# Patient Record
Sex: FEMALE | Race: WHITE | NOT HISPANIC OR LATINO | Employment: FULL TIME | URBAN - METROPOLITAN AREA
[De-identification: names, ages, dates, MRNs, and addresses within clinical notes are randomized per-mention and may not be internally consistent; named-entity substitution may affect disease eponyms.]

---

## 2021-05-28 ENCOUNTER — RECORDS - HEALTHEAST (OUTPATIENT)
Dept: ADMINISTRATIVE | Facility: CLINIC | Age: 56
End: 2021-05-28

## 2021-05-29 ENCOUNTER — RECORDS - HEALTHEAST (OUTPATIENT)
Dept: ADMINISTRATIVE | Facility: CLINIC | Age: 56
End: 2021-05-29

## 2021-05-30 ENCOUNTER — RECORDS - HEALTHEAST (OUTPATIENT)
Dept: ADMINISTRATIVE | Facility: CLINIC | Age: 56
End: 2021-05-30

## 2021-06-01 ENCOUNTER — RECORDS - HEALTHEAST (OUTPATIENT)
Dept: ADMINISTRATIVE | Facility: CLINIC | Age: 56
End: 2021-06-01

## 2021-07-13 ENCOUNTER — RECORDS - HEALTHEAST (OUTPATIENT)
Dept: ADMINISTRATIVE | Facility: CLINIC | Age: 56
End: 2021-07-13

## 2021-07-21 ENCOUNTER — RECORDS - HEALTHEAST (OUTPATIENT)
Dept: ADMINISTRATIVE | Facility: CLINIC | Age: 56
End: 2021-07-21

## 2022-11-23 ENCOUNTER — APPOINTMENT (OUTPATIENT)
Dept: RADIOLOGY | Facility: CLINIC | Age: 57
End: 2022-11-23
Payer: OTHER MISCELLANEOUS

## 2022-11-23 ENCOUNTER — APPOINTMENT (OUTPATIENT)
Dept: CT IMAGING | Facility: CLINIC | Age: 57
End: 2022-11-23
Payer: OTHER MISCELLANEOUS

## 2022-11-23 ENCOUNTER — HOSPITAL ENCOUNTER (EMERGENCY)
Facility: CLINIC | Age: 57
Discharge: HOME | End: 2022-11-23
Attending: FAMILY MEDICINE | Admitting: FAMILY MEDICINE
Payer: OTHER MISCELLANEOUS

## 2022-11-23 VITALS
DIASTOLIC BLOOD PRESSURE: 84 MMHG | WEIGHT: 181 LBS | RESPIRATION RATE: 18 BRPM | HEART RATE: 56 BPM | HEIGHT: 65 IN | OXYGEN SATURATION: 99 % | TEMPERATURE: 98.1 F | BODY MASS INDEX: 30.16 KG/M2 | SYSTOLIC BLOOD PRESSURE: 141 MMHG
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
## 2022-11-23 DIAGNOSIS — M25.521 PAIN OF BOTH ELBOWS: ICD-10-CM

## 2022-11-23 DIAGNOSIS — S09.90XA INJURY OF HEAD, INITIAL ENCOUNTER: ICD-10-CM

## 2022-11-23 DIAGNOSIS — M53.3 SACRAL PAIN: ICD-10-CM

## 2022-11-23 DIAGNOSIS — S50.00XA CONTUSION OF ELBOW, UNSPECIFIED LATERALITY, INITIAL ENCOUNTER: ICD-10-CM

## 2022-11-23 DIAGNOSIS — M25.522 PAIN OF BOTH ELBOWS: ICD-10-CM

## 2022-11-23 PROCEDURE — 70450 CT HEAD/BRAIN W/O DYE: CPT

## 2022-11-23 PROCEDURE — 99285 EMERGENCY DEPT VISIT HI MDM: CPT | Mod: 25

## 2022-11-23 PROCEDURE — 73080 X-RAY EXAM OF ELBOW: CPT | Mod: RT

## 2022-11-23 PROCEDURE — 72220 X-RAY EXAM SACRUM TAILBONE: CPT

## 2022-11-23 ASSESSMENT — ENCOUNTER SYMPTOMS
NUMBNESS: 0
VOMITING: 0
NECK PAIN: 1
BACK PAIN: 1
ROS GI COMMENTS: NEGATIVE FOR INCONTINENCE.
PHOTOPHOBIA: 0
WEAKNESS: 0
NAUSEA: 1
PALPITATIONS: 0
BRUISES/BLEEDS EASILY: 0
HEADACHES: 1
ARTHRALGIAS: 1

## 2022-11-23 ASSESSMENT — ACTIVITIES OF DAILY LIVING (ADL): ADLS_ACUITY_SCORE: 35

## 2022-11-23 NOTE — ED PROVIDER NOTES
EMERGENCY DEPARTMENT ENCOUNTER      NAME: Keyana Dickerson  AGE: 57 year old female  YOB: 1965  MRN: [id number]  EVALUATION DATE & TIME: 11/23/2022  7:25 AM    PCP: No primary care provider on file.    ED PROVIDER: Dwayne Villalta M.D.    Chief Complaint   Patient presents with     Fall     Headache     Elbow Injury       FINAL IMPRESSION:  1. Fall, initial encounter    2. Pain of both elbows    3. Sacral pain    4. Contusion of elbow, unspecified laterality, initial encounter    5. Injury of head, initial encounter        ED COURSE & MEDICAL DECISION MAKING:    Pertinent Labs & Imaging studies personally reviewed and interpreted by me. (See chart for details)  7:34 AM Patient seen and examined by resident, Dr Dc. Prior records reviewed.  8:20 AM I met and examined the patient, obtained additional history.  Differential diagnosis includes but not limited to intracranial hemorrhage, skull fracture, contusion, scalp laceration, concussion, facial fracture, nasal fracture, mandible fracture, dental fracture.  Patient presents with slip and fall, hit the back of her head.  Persistent headache, no vomiting, no numbness or tingling in the arms or legs.  CT scan ordered per [facility] head injury criteria.  No midline cervical tenderness, full spontaneous range of motion of the neck, no indication for cervical imaging.  X-rays of the elbows ordered although patient has full range of motion and mild tenderness.  She does have some tenderness of the sacrum, x-rays ordered.  8:36 AM CT scan of the head personally reviewed and interpreted by me, no acute intracranial findings.  Radiology interpretation agrees.  X-rays of the elbows and sacrum are pending.  10:18 AM We discussed the plan for discharge and the patient is agreeable. Reviewed supportive cares, symptomatic treatment, outpatient follow up, and reasons to return to the Emergency Department. All questions and concerns were addressed. Patient to  be discharged by ED RN.      At the conclusion of the encounter I discussed the results of all of the tests and the disposition. The questions were answered. The patient or family acknowledged understanding and was agreeable with the care plan.     Medical Decision Making    Supplemental history from: N/A    External Record(s) Reviewed: Documented in HPI, if applicable.    Differential Diagnosis: See MDM charting for differential considered.     I performed an independent interpretation of the: Other: CT head, elbow xrays, sacrum/coccyx xrays    Discussed with radiology regarding test interpretation: CT Results and X-ray Results    Discussion of management with another provider: See chart documentation, if applicable    The following testing was considered but ultimately not selected: CT Imaging: pelvis, cervical spine and Labs: CBC, UA, Trop    I considered prescription management with: Symptomatic Management    The patient's care impacted: None    Consideration of Admission/Observation: N/A - Patient discharged without consideration for admission    Care significantly affected by Social Determinants of Health including: N/A    PROCEDURES:   Procedures    MEDICATIONS GIVEN IN THE EMERGENCY:  Medications - No data to display    NEW PRESCRIPTIONS STARTED AT TODAY'S ER VISIT  New Prescriptions    No medications on file       =================================================================    [facility]    Patient information was obtained from: patient       Keyana Dickerson is a 57 year old female with a pertinent history of anxiety and depression who presents to this ED by private car for evaluation of fall.    The patient reports she fell in a walk-in freezer at ~6:15 this morning. She states she slipped and then fell backward. She says she hit her head, elbows, and back on the floor. She denies any loss of consciousness and states her pony tail kept her head from hitting the ground as hard. The patient now endorses a  headache which starts in the back of her head and radiates up into her forehead. She took Excedrin for this which seems to be helping. She also endorses neck pain, bilateral elbow pain (R>L), low back stiffness, and tailbone tenderness. She denies any sharp pain in her low back or buttocks. She has been icing her right elbow since the fall. She notes she was nauseous when a coworker initially moved her in a cart but denies any nausea since. She denies any vomiting, vision changes, tinnitus, numbness, weakness, loss of bowel/bladder control, heel pain, or any other complaints at this time. She denies being on blood thinners.      REVIEW OF SYSTEMS   Review of Systems   Eyes: Negative for photophobia and visual disturbance.   Cardiovascular: Negative for chest pain and palpitations.   Gastrointestinal: Positive for nausea (resolved). Negative for vomiting.        Negative for incontinence.   Genitourinary:        Negative for incontinence.   Musculoskeletal: Positive for arthralgias (bilateral elbow pain, right worse than left), back pain (low back, stiff) and neck pain.        Positive for tailbone tenderness.   Neurological: Positive for headaches. Negative for syncope, weakness and numbness.   Hematological: Does not bruise/bleed easily.   All other systems reviewed and are negative.      PAST MEDICAL HISTORY:  History reviewed. No pertinent past medical history.    PAST SURGICAL HISTORY:  Past Surgical History:   Procedure Laterality Date     LAPAROSCOPIC CHOLECYSTECTOMY N/A 6/16/2015    Procedure: CHOLECYSTECTOMY LAPAROSCOPIC;  Surgeon: Heron Richmond MD;  Location: [facility];  Service:      OTHER SURGICAL HISTORY      removal of warts on foot     OTHER SURGICAL HISTORY      removal of skin canceron nose       CURRENT MEDICATIONS:    No current facility-administered medications for this encounter.     Current Outpatient Medications   Medication     buPROPion (WELLBUTRIN) 75 MG tablet      "cholecalciferol, vitamin D3, 1,000 unit tablet     FLUoxetine (PROZAC) 10 MG capsule     ketotifen (ZADITOR/ZYRTEC ITCHY EYES) 0.025 % ophthalmic solution     loratadine (CLARITIN) 10 mg tablet     MEDICATION CANNOT BE REORDERED - PLEASE MANUALLY REORDER AND DISCONTINUE THE OLD ORDER     multivitamin therapeutic (THERAGRAN) tablet     oxyCODONE-acetaminophen (PERCOCET) 5-325 mg per tablet     peg 400-propylene glycol (SYSTANE) 0.4-0.3 % Drop       ALLERGIES:  Allergies   Allergen Reactions     Codeine Sulfate [Codeine] Nausea and Vomiting       FAMILY HISTORY:  Family History   Problem Relation Age of Onset     Breast Cancer Maternal Grandmother         70s     Testicular cancer Paternal Grandfather         70s       SOCIAL HISTORY:   Social History     Socioeconomic History     Marital status:    Tobacco Use     Smoking status: Former     Tobacco comments:     quit 18 years ago   Substance and Sexual Activity     Alcohol use: Yes     Comment: Alcoholic Drinks/day: occasional     Drug use: No       VITALS:  BP (!) 141/84   Pulse 56   Temp 98.1  F (36.7  C) (Oral)   Resp 18   Ht 1.651 m (5' 5\")   Wt 82.1 kg (181 lb)   SpO2 99%   BMI 30.12 kg/m      PHYSICAL EXAM:  Physical Exam  Vitals and nursing note reviewed.   Constitutional:       Appearance: Normal appearance.   HENT:      Head: Normocephalic and atraumatic.      Right Ear: External ear normal.      Left Ear: External ear normal.      Nose: Nose normal.      Mouth/Throat:      Mouth: Mucous membranes are moist.   Eyes:      Extraocular Movements: Extraocular movements intact.      Conjunctiva/sclera: Conjunctivae normal.      Pupils: Pupils are equal, round, and reactive to light.   Cardiovascular:      Rate and Rhythm: Normal rate and regular rhythm.   Pulmonary:      Effort: Pulmonary effort is normal.      Breath sounds: Normal breath sounds. No wheezing or rales.   Abdominal:      General: Abdomen is flat. There is no distension.      " Palpations: Abdomen is soft.      Tenderness: There is no abdominal tenderness. There is no guarding.   Musculoskeletal:         General: Normal range of motion.      Right upper arm: No tenderness or bony tenderness.      Left upper arm: No tenderness or bony tenderness.      Right elbow: No swelling, deformity or lacerations. Normal range of motion. No tenderness.      Left elbow: No swelling, deformity or lacerations. Normal range of motion. No tenderness.      Right forearm: No tenderness.      Left forearm: No tenderness.      Cervical back: Normal range of motion and neck supple.      Right lower leg: No edema.      Left lower leg: No edema.      Comments: Bilateral elbow tenderness with full range of motion.  Sacral tenderness   Lymphadenopathy:      Cervical: No cervical adenopathy.   Skin:     General: Skin is warm and dry.      Findings: No abrasion, bruising, erythema, lesion or wound.   Neurological:      General: No focal deficit present.      Mental Status: She is alert and oriented to person, place, and time. Mental status is at baseline.      Cranial Nerves: Cranial nerves 2-12 are intact.      Sensory: Sensation is intact.      Motor: Motor function is intact.      Coordination: Coordination is intact.      Comments: No gross focal neurologic deficits   Psychiatric:         Mood and Affect: Mood normal.         Behavior: Behavior normal.         Thought Content: Thought content normal.          LAB:  All pertinent labs reviewed and interpreted.  Results for orders placed or performed during the hospital encounter of 11/23/22   XR Sacrum and Coccyx 2 Views    Impression    IMPRESSION: No definite evidence of an acute fracture. There is some periosteal new bone formation along the anterior margin of the distal sacrum which may reflect a healing nondisplaced fracture. Pessary device in place.   CT Head w/o Contrast    Impression    IMPRESSION:  1.  No evidence of acute intracranial hemorrhage.  2.  No  evidence of acute calvarial fracture.   XR Elbow Right G/E 3 Views    Impression    IMPRESSION: Normal joint spaces and alignment. No fracture or joint effusion. Mild soft tissue swelling posteriorly over the olecranon.   XR Elbow Left G/E 3 Views    Impression    IMPRESSION: Normal joint spaces and alignment. No fracture or joint effusion. Mild soft tissue swelling posteriorly over the olecranon.       RADIOLOGY:  Reviewed all pertinent imaging. Please see official radiology report.  XR Elbow Left G/E 3 Views   Final Result   IMPRESSION: Normal joint spaces and alignment. No fracture or joint effusion. Mild soft tissue swelling posteriorly over the olecranon.      XR Elbow Right G/E 3 Views   Final Result   IMPRESSION: Normal joint spaces and alignment. No fracture or joint effusion. Mild soft tissue swelling posteriorly over the olecranon.      XR Sacrum and Coccyx 2 Views   Final Result   IMPRESSION: No definite evidence of an acute fracture. There is some periosteal new bone formation along the anterior margin of the distal sacrum which may reflect a healing nondisplaced fracture. Pessary device in place.      CT Head w/o Contrast   Final Result   IMPRESSION:   1.  No evidence of acute intracranial hemorrhage.   2.  No evidence of acute calvarial fracture.        I, Elisabet Crump, am serving as a scribe to document services personally performed by Dr. Villalta based on my observation and the provider's statements to me. I, Dwayne Villalta MD attest that Elisabet Crump is acting in a scribe capacity, has observed my performance of the services and has documented them in accordance with my direction.    Dwayne Villalta M.D.  Emergency Medicine  [facility] EMERGENCY ROOM  [address] [id number] [phone]  Dept: [phone]

## 2022-11-23 NOTE — ED PROVIDER NOTES
EMERGENCY DEPARTMENT ENCOUNTER      NAME: eKyana Dickerson  AGE: 57 year old female  YOB: 1965  MRN: [id number]  EVALUATION DATE & TIME: 11/23/2022  7:25 AM    PCP: No primary care provider on file.    ED PROVIDER: Davina Dc MD    I was present with the resident, and personally performed a history of present illness, review of systems, and exam.  I discussed the case with the resident and agree with the findings and plan as documented in the resident's note.  Dwayne Villalta MD      Chief Complaint   Patient presents with     Fall     Headache     Elbow Injury         FINAL IMPRESSION:  1. Fall, initial encounter    2. Pain of both elbows    3. Sacral pain    4. Contusion of elbow, unspecified laterality, initial encounter    5. Injury of head, initial encounter        ED COURSE & MEDICAL DECISION MAKING:    Pertinent Labs & Imaging studies reviewed. (See chart for details)  57 year old female presents to the Emergency Department for evaluation of mechanical fall and hitting her head without LOC. She has headache, elbow pain and sacral tenderness. Exam was unremarkable. Ordered CT head according to [facility] Head Trauma/Injury Rule that was negative. Xray of elbow and sacrum was negative for acute fracture. Patient discharged home to follow up with concussion clinic.       ED Course as of 11/23/22 0937   Wed Nov 23, 2022   0818 Presented to the ED after mechanical fall at work hitting the back of her head. She has headaches but is neurologically intact.    0835 CT head negative for intracranial process or calvarial fracture.    0836 Xray of bilateral elbows and sacrum negative for acute fracture.         At the conclusion of the encounter I discussed the results of all of the tests and the disposition. The questions were answered. The patient or family acknowledged understanding and was agreeable with the care plan.       MEDICATIONS GIVEN IN THE EMERGENCY:  Medications - No data to  display    NEW PRESCRIPTIONS STARTED AT TODAY'S ER VISIT  New Prescriptions    No medications on file          =================================================================    HPI    Patient information was obtained from: Patient     Keyana Dickerson is a 57 year old female with a pertinent history of anxiety and MDD who presentes to the ED due to mechanical fall. Patient states that at 6:15.  She was walking into a walk-in cooler at work in [employer] when she slid and fell backwards due to slippery floor. Patient states that she fell backwards and hit the back of her head. She denied LOC. She developed headache at the back of her head and the front. She denied visual changes, ringing in her ears. She felt pain in both elbows and her sacrum. Patient denied numbness, tingling, weakness, loss of bladder or bowel control. She had Excedrin for the headaches. She reported nausea when she was transferred on a cart, but denies emesis. She was able to ambulate from the car to the ED without assistance. In the ED patient reports that she still has an headache, bilateral elbow pain and tenderness on her sacrum.     REVIEW OF SYSTEMS   As in HPI above, otherwise reminder of ROS negative.     PAST MEDICAL HISTORY:  History reviewed. No pertinent past medical history.    PAST SURGICAL HISTORY:  Past Surgical History:   Procedure Laterality Date     LAPAROSCOPIC CHOLECYSTECTOMY N/A 6/16/2015    Procedure: CHOLECYSTECTOMY LAPAROSCOPIC;  Surgeon: Heron Richmond MD;  Location: [facility] OR;  Service:      OTHER SURGICAL HISTORY      removal of warts on foot     OTHER SURGICAL HISTORY      removal of skin canceron nose           CURRENT MEDICATIONS:    buPROPion (WELLBUTRIN) 75 MG tablet  cholecalciferol, vitamin D3, 1,000 unit tablet  FLUoxetine (PROZAC) 10 MG capsule  ketotifen (ZADITOR/ZYRTEC ITCHY EYES) 0.025 % ophthalmic solution  loratadine (CLARITIN) 10 mg tablet  MEDICATION CANNOT BE REORDERED - PLEASE MANUALLY  "REORDER AND DISCONTINUE THE OLD ORDER  multivitamin therapeutic (THERAGRAN) tablet  oxyCODONE-acetaminophen (PERCOCET) 5-325 mg per tablet  peg 400-propylene glycol (SYSTANE) 0.4-0.3 % Drop        ALLERGIES:  Allergies   Allergen Reactions     Codeine Sulfate [Codeine] Nausea and Vomiting       FAMILY HISTORY:  Family History   Problem Relation Age of Onset     Breast Cancer Maternal Grandmother         70s     Testicular cancer Paternal Grandfather         70s       SOCIAL HISTORY:   Social History     Socioeconomic History     Marital status:      Spouse name: None     Number of children: None     Years of education: None     Highest education level: None   Tobacco Use     Smoking status: Former     Tobacco comments:     quit 18 years ago   Substance and Sexual Activity     Alcohol use: Yes     Comment: Alcoholic Drinks/day: occasional     Drug use: No       VITALS:  BP (!) 141/84   Pulse 56   Temp 98.1  F (36.7  C) (Oral)   Resp 18   Ht 1.651 m (5' 5\")   Wt 82.1 kg (181 lb)   SpO2 99%   BMI 30.12 kg/m      PHYSICAL EXAM    Constitutional: Well developed, Well nourished, NAD  HENT: Normocephalic, Atraumatic  Neck- Normal range of motion, No tenderness, mild discomfort with extension   Eyes: EOMI, Conjunctiva normal, No discharge.   Respiratory: Normal breath sounds, No respiratory distress, No wheezing  Cardiovascular: Normal heart rate, Regular rhythm, No murmurs  GI: No excessive obesity. Bowel sounds normal, Soft, No tenderness  Musculoskeletal: 2+ DP pulses. No edema. No bruising or scrapping.  Good range of motion in all major joints. No tenderness to palpation or major deformities noted. No tenderness of the CTL spine, however tenderness on S spine.  Neurologic: Alert & oriented x 3, Normal motor function, Normal sensory function, No focal deficits noted. Normal gait.  Psychiatric: Affect normal, Judgment normal, Mood normal.     LAB:  All pertinent labs reviewed and interpreted.  Results for " orders placed or performed during the hospital encounter of 11/23/22   XR Sacrum and Coccyx 2 Views    Impression    IMPRESSION: No definite evidence of an acute fracture. There is some periosteal new bone formation along the anterior margin of the distal sacrum which may reflect a healing nondisplaced fracture. Pessary device in place.   CT Head w/o Contrast    Impression    IMPRESSION:  1.  No evidence of acute intracranial hemorrhage.  2.  No evidence of acute calvarial fracture.   XR Elbow Right G/E 3 Views    Impression    IMPRESSION: Normal joint spaces and alignment. No fracture or joint effusion. Mild soft tissue swelling posteriorly over the olecranon.   XR Elbow Left G/E 3 Views    Impression    IMPRESSION: Normal joint spaces and alignment. No fracture or joint effusion. Mild soft tissue swelling posteriorly over the olecranon.       RADIOLOGY:  Reviewed all pertinent imaging. Please see official radiology report.  XR Elbow Left G/E 3 Views   Final Result   IMPRESSION: Normal joint spaces and alignment. No fracture or joint effusion. Mild soft tissue swelling posteriorly over the olecranon.      XR Elbow Right G/E 3 Views   Final Result   IMPRESSION: Normal joint spaces and alignment. No fracture or joint effusion. Mild soft tissue swelling posteriorly over the olecranon.      XR Sacrum and Coccyx 2 Views   Final Result   IMPRESSION: No definite evidence of an acute fracture. There is some periosteal new bone formation along the anterior margin of the distal sacrum which may reflect a healing nondisplaced fracture. Pessary device in place.      CT Head w/o Contrast   Final Result   IMPRESSION:   1.  No evidence of acute intracranial hemorrhage.   2.  No evidence of acute calvarial fracture.          I precepted with Dr. Villalta.     Davina Dc MD  [facility] EMERGENCY ROOM  [address] [id number] [phone]     Davina Dc,  MD  Resident  11/23/22 0935       Dwayne Villalta MD  11/23/22 0437

## 2022-11-23 NOTE — LETTER
RETURN TO WORK/SCHOOL FORM    11/23/2022    Re: Keyana Dickerson  1965      To Whom It May Concern:     Keyana Dickerson at the Emergency Room today.  She may return to work without restrictions on 11/28/22. If you have any questions or concerns, please feel free to call us.           Restrictions:  Nonefull duty          Davina Dc MD   11/23/2022 9:39 AM

## 2022-11-23 NOTE — DISCHARGE INSTRUCTIONS
Take Tylenol and ibuprofen as needed for headache.    The concussion clinic will call you for follow-up care.

## 2022-11-23 NOTE — ED TRIAGE NOTES
Patient presents after a fall at work, slipping on wet floor, hit the back of her head and back when she fell and now has pain to front of head, pain to both elbows, more pain to R elbow, has some neck pain with no C spine tenderness, denies LOC, denies taking thinners.  Rachna Em RN.......11/23/2022 7:24 AM     Triage Assessment     Row Name 11/23/22 0520       Triage Assessment (Adult)    Airway WDL WDL       Respiratory WDL    Respiratory WDL WDL       Skin Circulation/Temperature WDL    Skin Circulation/Temperature WDL WDL       Cardiac WDL    Cardiac WDL WDL       Peripheral/Neurovascular WDL    Peripheral Neurovascular WDL WDL       Cognitive/Neuro/Behavioral WDL    Cognitive/Neuro/Behavioral WDL WDL

## 2023-01-06 ENCOUNTER — TELEPHONE (OUTPATIENT)
Dept: NEUROLOGY | Facility: CLINIC | Age: 58
End: 2023-01-06

## 2023-01-06 ENCOUNTER — VIRTUAL VISIT (OUTPATIENT)
Dept: NEUROLOGY | Facility: CLINIC | Age: 58
End: 2023-01-06
Payer: OTHER MISCELLANEOUS

## 2023-01-06 DIAGNOSIS — S09.90XA INJURY OF HEAD, INITIAL ENCOUNTER: ICD-10-CM

## 2023-01-06 DIAGNOSIS — F07.81 POST CONCUSSION SYNDROME: Primary | ICD-10-CM

## 2023-01-06 PROCEDURE — 99204 OFFICE O/P NEW MOD 45 MIN: CPT | Mod: 95 | Performed by: NURSE PRACTITIONER

## 2023-01-06 NOTE — NURSING NOTE
Concussion Symptoms Rating    Headache or Pressure In Head 0 - none   Upset Stomach or Throwing Up 0 - none   Problems with Balance 0 - none   Feeling Dizzy 0 - none   Sensitivity to Light 0 - none   Sensitivity to Noise 0 - none   Mood Changes 0 - none   Feeling sluggish, hazy, or foggy 1 - mild   Trouble Concentrating, Lack of Focus 2 - mild to moderate   Motion Sickness 0 - none   Vision Changes 0 - none   Memory Problems 1 - mild   Feeling Confused 0 - none   Neck Pain 0 - none   Trouble Sleeping 0 - none   Total Number of Symptoms 3   Symptom Severity Score 4
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
## 2023-01-09 ENCOUNTER — TELEPHONE (OUTPATIENT)
Dept: NEUROLOGY | Facility: CLINIC | Age: 58
End: 2023-01-09

## 2023-01-09 NOTE — TELEPHONE ENCOUNTER
"Per Marly's message:    \"Please email patient work letter, QRC list, and consult overview and help her get on my chart.\"    I emailed the pt her work letter, QRC list, and consult overview to the pt at [email or web address].    I called the pt to inform her that the work letter, qrc list and the overview sheet was emailed to her. I was going to help her get set up for [employer] but per pt is at another appt and asked that I call her back at 3pm.     I will call pt back at 3pm.    RINA Franco on 1/9/2023 at 9:57 AM        "

## 2023-01-09 NOTE — TELEPHONE ENCOUNTER
Called the pt back and per pt was able to get set up for MyChart and have no other questions. Per pt has not heard from someone about scheduling for OT and Speech therapy. I gave the pt the number to call, [phone] . Pt had no other questions.    RINA Franco on 1/9/2023 at 3:52 PM

## 2023-02-11 ENCOUNTER — HEALTH MAINTENANCE LETTER (OUTPATIENT)
Age: 58
End: 2023-02-11

## 2023-03-17 ENCOUNTER — VIRTUAL VISIT (OUTPATIENT)
Dept: NEUROLOGY | Facility: CLINIC | Age: 58
End: 2023-03-17
Payer: OTHER MISCELLANEOUS

## 2023-03-17 DIAGNOSIS — F07.81 POST CONCUSSION SYNDROME: Primary | ICD-10-CM

## 2023-03-17 PROCEDURE — 99213 OFFICE O/P EST LOW 20 MIN: CPT | Mod: 95 | Performed by: NURSE PRACTITIONER

## 2023-03-17 NOTE — PROGRESS NOTES
"  Telephone Visit: Concussion Follow up:   Keyana Dickerson is a 57 year old female who is being evaluated via a billable telephone visit       The patient has been notified of the following:     \"This telephone visit will be conducted via a phone call between you and your provider. We have found that certain health care needs can be provided without the need for a physical exam.  This service lets us provide the care you need with a short phone conversation.  If a prescription is necessary we can send it directly to your pharmacy.  If lab work is needed we can place an order for that and you can then stop by our lab to have the test done at a later time.    If during the course of the call the provider feels a telephone visit is not appropriate, you will not be charged for this service.\"     Patient has given verbal consent to a telephone visit? Yes    Visit Check In:   Orders from previous visit: OT and ST for cognition, Short Neuropsych     Neuropsychological assessment completed    No   Currently doing PT  No    Completed No   Currently doing OT  No    Completed No   Currently doing ST   No    Completed No   Psychology  No   Return to Work/School   Full scheduled hours  Yes      Currently on medication to help with sleep    No    Currently on any mental health medications     Yes    Prozac and Wellbutrin  Currently on medication for attention, ADD/ADHD    No                                                          Workman's Comp   Yes   QRC   No      Outpatient Follow up Mild TBI (Concussion)  Evaluation:   Keyana Dickerson chief complaint is Post Concussion Syndrome     Is patient on a controlled substance prescribed by me?  No     HPI:      Pertinent History:   Per ED note on 11/23/22...Keyana Dickerson is a 57 year old female with a pertinent history of anxiety and MDD who presents to the ED due to mechanical fall. Patient states that at 6:15.  She was walking into a walk-in cooler at work in [facility] when she slid " and fell backwards due to slippery floor. Patient states that she fell backwards and hit the back of her head. She denied LOC. She developed headache at the back of her head and the front. She denied visual changes, ringing in her ears. She felt pain in both elbows and her sacrum. Patient denied numbness, tingling, weakness, loss of bladder or bowel control. She had Excedrin for the headaches. She reported nausea when she was transferred on a cart, but denies emesis. She was able to ambulate from the car to the ED without assistance. In the ED patient reports that she still has an headache, bilateral elbow pain and tenderness on her sacrum.     Date of accident :  11/23/22    Plan:        We discussed some treatment options and have elected to   patient will get a QR C, she will still make sure she is approved for occupational and speech therapy/OT and ST will be reordered, we will put neuropsych on hold, and wait for that to be approved before scheduling, patient will continue with full work hours with accommodations    Medication Adjustment:  No medication changes    Return to Work/School   Full scheduled hours  Yes    Note completed    Yes      Return to clinic 10 weeks    Continue with the support of the clinic, reassurance, and redirection. Staff monitoring and ongoing assessments per team plan. This team will utilize appropriate emergency services if necessary. I will make myself available if concerns or problems arise.  The patient agrees to call/message before her next visit with any questions, concerns or problems.    Progress Note:        The patient returns to the concussion clinic for a follow up visit, She was last seen by me on 1/6/2023, no medication changes were made at that appointment.  The patient was unable to get hold of her  to make sure that the therapies were approved so the patient has not yet started these.  She is having a very difficult time getting a hold of the  and she  was waiting until they are approved to start.  The patient's main concern continues to be her cognition.  She feels that she has a really bad memory and cannot concentrate.  Overall patient is reporting no change in mental fogginess, concentration, and memory.  Patient does report worsening in sensitivity to light and noise, dizziness, motion sickness, being able to fall and stay asleep and wake up feeling rested     Subjective:        Overall improvement from last visit   No     Physical Symptoms:  Headache-No        Nausea-No      Balance problems - Yes    Since last visit  Worsen      Dizziness - Yes          Since last visit  Worsen     Visual problems - No      Fatigue - Yes              Since last visit  Worsen     Sensitivity to light - Yes        Since last visit  Worsen  Sensitivity to sound - Yes       Since last visit  Worsen     Numbness/tingling - No             Cognitive Symptoms  Feeling mentally foggy -Yes        Since last visit  Same     Feeling confused -No          Difficulty Concentrating- Yes      Since last visit  Same     Difficulty remembering - Yes        Since last visit  Same       Emotional Symptoms  Irritability - No            Sadness-  No          More emotional - No           Nervousness/anxiety -No             Sleep History:  Sleep less than usual - No    Sleep more than usual - No    Trouble falling asleep - Yes       Since last visit  Worsen     Trouble staying asleep - Yes       Since last visit  Worsen     Wake feeling rested - No         Since last visit  Worsen        Exertion:         Do the above stated symptoms worsen with physical activity? Yes        Since last visit  Worsen           Do the above stated symptoms worsen with cognitive activity? Yes       Since last visit  Worsen            Objective:     Patient Active Problem List    Diagnosis Date Noted     Vitamin D Deficiency      Priority: Medium     Created by Conversion  Replacement Utility updated for latest O  load         Major Depression, Recurrent      Priority: Medium     Created by Conversion  Replacement Utility updated for latest IMO load         Anxiety      Priority: Medium     Created by Conversion  Replacement Utility updated for latest IMO load         Menopausal Disorder      Priority: Medium     Created by Conversion  Replacement Utility updated for latest IMO load         Symptomatic cholelithiasis 06/17/2015     Priority: Medium     Lap verónica 6/16/15         Serum Total Cholesterol Was Elevated      Priority: Medium     Created by Conversion         Costochondritis (Tietze's Syndrome)      Priority: Medium     Created by Conversion         [place] Of [address] Motor Vehicles On Road - Driving (Not Motorcycle      Priority: Medium     Created by Conversion         Depression      Priority: Medium     Created by Conversion         Xerosis Cutis      Priority: Medium     Created by Conversion         No past medical history on file.  Past Surgical History:   Procedure Laterality Date     LAPAROSCOPIC CHOLECYSTECTOMY N/A 6/16/2015    Procedure: CHOLECYSTECTOMY LAPAROSCOPIC;  Surgeon: Heron Richmond MD;  Location: [facility];  Service:      OTHER SURGICAL HISTORY      removal of warts on foot     OTHER SURGICAL HISTORY      removal of skin canceron nose     Family History   Problem Relation Age of Onset     Breast Cancer Maternal Grandmother         70s     Testicular cancer Paternal Grandfather         70s     Current Outpatient Medications   Medication Sig Dispense Refill     buPROPion (WELLBUTRIN) 75 MG tablet [BUPROPION (WELLBUTRIN) 75 MG TABLET] Take 1 tablet (75 mg total) by mouth daily. 90 tablet 1     cholecalciferol, vitamin D3, 1,000 unit tablet [CHOLECALCIFEROL, VITAMIN D3, 1,000 UNIT TABLET] Take 1,000 Units by mouth daily.       FLUoxetine (PROZAC) 10 MG capsule [FLUOXETINE (PROZAC) 10 MG CAPSULE] Take 3 capsules (30 mg total) by mouth daily. 90 capsule 0     loratadine (CLARITIN) 10 mg tablet  [LORATADINE (CLARITIN) 10 MG TABLET] Take 10 mg by mouth daily.       oxyCODONE-acetaminophen (PERCOCET) 5-325 mg per tablet [OXYCODONE-ACETAMINOPHEN (PERCOCET) 5-325 MG PER TABLET] Take 1-2 tablets by mouth every 6 (six) hours as needed for pain. 20 tablet 0     Social History     Socioeconomic History     Marital status:      Spouse name: Not on file     Number of children: Not on file     Years of education: Not on file     Highest education level: Not on file   Occupational History     Not on file   Tobacco Use     Smoking status: Former     Smokeless tobacco: Not on file     Tobacco comments:     quit 18 years ago   Substance and Sexual Activity     Alcohol use: Yes     Comment: Alcoholic Drinks/day: occasional     Drug use: No     Sexual activity: Not on file   Other Topics Concern     Not on file   Social History Narrative     Not on file     Social Determinants of Health     Financial Resource Strain: Not on file   Food Insecurity: Not on file   Transportation Needs: Not on file   Physical Activity: Not on file   Stress: Not on file   Social Connections: Not on file   Intimate Partner Violence: Not on file   Housing Stability: Not on file       ALLERGIES  Codeine sulfate [codeine]    The following portions of the patient's history were reviewed and updated as appropriate: allergies, current medications, past family history, past medical history, past social history, past surgical history and problem list.    Review of Systems  A comprehensive review of systems was negative except for: What is noted above    Mental Status Examination  Alertness:  alert  and oriented  Appearance:  casually groomed  Behavior/Demeanor:  cooperative, pleasant and calm, with good  eye contact.  Speech:  normal  Psychomotor:  normal or unremarkable    Mood:  good  Affect:  appropriate and was congruent to speech content.  Thought Process/Associations: unremarkable   Thought Content: devoid of  suicidal and violent ideation  and delusions.   Perception: devoid of  auditory hallucinations and visual hallucinations  Insight:  good.  Judgment: good.  Attention/Concentration:  Normal  Language:  Intact  Fund of Knowledge:  Average.    Memory:  Immediate recall intact, Short-term memory intact and Long-term memory intact.       Counseling:   Discussion was held with the patient today regarding concussion in general including types of injury, symptoms that are common, treatment and variability in time to recover  I have reassured the patient her symptoms are very common when a concussion is present and will improve with time. We discussed the risks and benefits of possible medication used to help concussive symptoms including risk of worsening depression with medication adjustments and even the possibility of emergence of suicidal ideations. We will assess for the appropriateness of possible psychotropic medication trials/changes. The patient will seek out appropriate emergency services should that become necessary. The patient agrees to call/message before her next visit with any questions, concerns or problems.    Visit Details:   Type of service: Telephone Visit    Visit Start Time: 1438    Visit End Time:  1448    Originating Location (pt. Location): Home    Distant Location:  Distant Location (provider location):  Off-site    Base Clinic:  [facility]  [place]    Mode of Communication: Telephone Conference via  [facility]     Diagnosis managed and treated at today's visit :  Post concussion syndrome  Post concussion headache  Nausea  Dizziness  Fatigue  Insomnia  Sensitivity to light  Sound sensitivity  Concentration and Attention deficit  Memory difficulties  Anxiety d/t a medical condition  Irritability  Return to work  Encounter related to a worker's comp claim    Total time today (15 min) in this patient encounter was spent on pre-charting, chart review, review of outside records, review of test results,  interpretation of tests, patient visit, documentation and return to work letter and counseling and/or coordination of care. The patient is in agreement with this plan and has no further questions.    General Information:   Today you had your appointment with Marly Verdugo CNP     If lab work was done today as part of your evaluation you will generally be contacted via My Chart, mail, or phone with the results within 1-5 days. If there is an alarming result we will contact you by phone. Lab results come back at varying times, I generally wait until all labs are resulted before making comments on results. Please note labs are automatically released to My Chart once available.     If you need refills please contact your pharmacist. They will send a refill request to me to review. If it is a controlled substance please message me through [employer]. Please allow 3 business days for us to process all refill requests.     Please call or send a medical message through My Chart, with any questions or concerns    If you need any paperwork completed please fax forms to [phone]. Please state if you would like a copy of the completed paperwork, mailed or faxed back to the patient and a fax number to fax the paperwork to. Please allow up to 10 business days for paperwork to be completed.    BABITA Lyman, CNP      [facility]  [facility] [address]  [address] [place] [address]  Office: [phone]  Fax: [phone]

## 2023-03-17 NOTE — LETTER
"    3/17/2023         RE: Keyana Dickerson  [address] [facility] [address]        Dear Colleague,    Thank you for referring your patient, Keyana Dickerson, to the [facility]. Please see a copy of my visit note below.      Telephone Visit: Concussion Follow up:   Keyana Dickerson is a 57 year old female who is being evaluated via a billable telephone visit       The patient has been notified of the following:     \"This telephone visit will be conducted via a phone call between you and your provider. We have found that certain health care needs can be provided without the need for a physical exam.  This service lets us provide the care you need with a short phone conversation.  If a prescription is necessary we can send it directly to your pharmacy.  If lab work is needed we can place an order for that and you can then stop by our lab to have the test done at a later time.    If during the course of the call the provider feels a telephone visit is not appropriate, you will not be charged for this service.\"     Patient has given verbal consent to a telephone visit? Yes    Visit Check In:   Orders from previous visit: OT and ST for cognition, Short Neuropsych     Neuropsychological assessment completed    No   Currently doing PT  No    Completed No   Currently doing OT  No    Completed No   Currently doing ST   No    Completed No   Psychology  No   Return to Work/School   Full scheduled hours  Yes      Currently on medication to help with sleep    No    Currently on any mental health medications     Yes    Prozac and Wellbutrin  Currently on medication for attention, ADD/ADHD    No                                                          Workman's Comp   Yes   QRC   No      Outpatient Follow up Mild TBI (Concussion)  Evaluation:   Keyana Dickerson chief complaint is Post Concussion Syndrome     Is patient on a controlled substance prescribed by me?  No     HPI:    "   Pertinent History:   Per ED note on 11/23/22...Keyana Dickerson is a 57 year old female with a pertinent history of anxiety and MDD who presents to the ED due to mechanical fall. Patient states that at 6:15.  She was walking into a walk-in cooler at work in [employer] when she slid and fell backwards due to slippery floor. Patient states that she fell backwards and hit the back of her head. She denied LOC. She developed headache at the back of her head and the front. She denied visual changes, ringing in her ears. She felt pain in both elbows and her sacrum. Patient denied numbness, tingling, weakness, loss of bladder or bowel control. She had Excedrin for the headaches. She reported nausea when she was transferred on a cart, but denies emesis. She was able to ambulate from the car to the ED without assistance. In the ED patient reports that she still has an headache, bilateral elbow pain and tenderness on her sacrum.     Date of accident :  11/23/22    Plan:        We discussed some treatment options and have elected to   patient will get a QR C, she will still make sure she is approved for occupational and speech therapy/OT and ST will be reordered, we will put neuropsych on hold, and wait for that to be approved before scheduling, patient will continue with full work hours with accommodations    Medication Adjustment:  No medication changes    Return to Work/School   Full scheduled hours  Yes    Note completed    Yes      Return to clinic 10 weeks    Continue with the support of the clinic, reassurance, and redirection. Staff monitoring and ongoing assessments per team plan. This team will utilize appropriate emergency services if necessary. I will make myself available if concerns or problems arise.  The patient agrees to call/message before her next visit with any questions, concerns or problems.    Progress Note:        The patient returns to the concussion clinic for a follow up visit, She was last seen by me on  1/6/2023, no medication changes were made at that appointment.  The patient was unable to get hold of her  to make sure that the therapies were approved so the patient has not yet started these.  She is having a very difficult time getting a hold of the  and she was waiting until they are approved to start.  The patient's main concern continues to be her cognition.  She feels that she has a really bad memory and cannot concentrate.  Overall patient is reporting no change in mental fogginess, concentration, and memory.  Patient does report worsening in sensitivity to light and noise, dizziness, motion sickness, being able to fall and stay asleep and wake up feeling rested     Subjective:        Overall improvement from last visit   No     Physical Symptoms:  Headache-No        Nausea-No      Balance problems - Yes    Since last visit  Worsen      Dizziness - Yes          Since last visit  Worsen     Visual problems - No      Fatigue - Yes              Since last visit  Worsen     Sensitivity to light - Yes        Since last visit  Worsen  Sensitivity to sound - Yes       Since last visit  Worsen     Numbness/tingling - No             Cognitive Symptoms  Feeling mentally foggy -Yes        Since last visit  Same     Feeling confused -No          Difficulty Concentrating- Yes      Since last visit  Same     Difficulty remembering - Yes        Since last visit  Same       Emotional Symptoms  Irritability - No            Sadness-  No          More emotional - No           Nervousness/anxiety -No             Sleep History:  Sleep less than usual - No    Sleep more than usual - No    Trouble falling asleep - Yes       Since last visit  Worsen     Trouble staying asleep - Yes       Since last visit  Worsen     Wake feeling rested - No         Since last visit  Worsen        Exertion:         Do the above stated symptoms worsen with physical activity? Yes        Since last visit  Worsen           Do the above  stated symptoms worsen with cognitive activity? Yes       Since last visit  Worsen            Objective:     Patient Active Problem List    Diagnosis Date Noted     Vitamin D Deficiency      Priority: Medium     Created by Conversion  Replacement Utility updated for latest IMO load         Major Depression, Recurrent      Priority: Medium     Created by Conversion  Replacement Utility updated for latest IMO load         Anxiety      Priority: Medium     Created by Conversion  Replacement Utility updated for latest IMO load         Menopausal Disorder      Priority: Medium     Created by Conversion  Replacement Utility updated for latest IMO load         Symptomatic cholelithiasis 06/17/2015     Priority: Medium     Lap verónica 6/16/15         Serum Total Cholesterol Was Elevated      Priority: Medium     Created by Conversion         Costochondritis (Tietze's Syndrome)      Priority: Medium     Created by Conversion         [place] Of [address] Motor Vehicles On Road - Driving (Not Motorcycle      Priority: Medium     Created by Conversion         Depression      Priority: Medium     Created by Conversion         Xerosis Cutis      Priority: Medium     Created by Conversion         No past medical history on file.  Past Surgical History:   Procedure Laterality Date     LAPAROSCOPIC CHOLECYSTECTOMY N/A 6/16/2015    Procedure: CHOLECYSTECTOMY LAPAROSCOPIC;  Surgeon: Heron Richmond MD;  Location: [facility];  Service:      OTHER SURGICAL HISTORY      removal of warts on foot     OTHER SURGICAL HISTORY      removal of skin canceron nose     Family History   Problem Relation Age of Onset     Breast Cancer Maternal Grandmother         70s     Testicular cancer Paternal Grandfather         70s     Current Outpatient Medications   Medication Sig Dispense Refill     buPROPion (WELLBUTRIN) 75 MG tablet [BUPROPION (WELLBUTRIN) 75 MG TABLET] Take 1 tablet (75 mg total) by mouth daily. 90 tablet 1     cholecalciferol, vitamin  D3, 1,000 unit tablet [CHOLECALCIFEROL, VITAMIN D3, 1,000 UNIT TABLET] Take 1,000 Units by mouth daily.       FLUoxetine (PROZAC) 10 MG capsule [FLUOXETINE (PROZAC) 10 MG CAPSULE] Take 3 capsules (30 mg total) by mouth daily. 90 capsule 0     loratadine (CLARITIN) 10 mg tablet [LORATADINE (CLARITIN) 10 MG TABLET] Take 10 mg by mouth daily.       oxyCODONE-acetaminophen (PERCOCET) 5-325 mg per tablet [OXYCODONE-ACETAMINOPHEN (PERCOCET) 5-325 MG PER TABLET] Take 1-2 tablets by mouth every 6 (six) hours as needed for pain. 20 tablet 0     Social History     Socioeconomic History     Marital status:      Spouse name: Not on file     Number of children: Not on file     Years of education: Not on file     Highest education level: Not on file   Occupational History     Not on file   Tobacco Use     Smoking status: Former     Smokeless tobacco: Not on file     Tobacco comments:     quit 18 years ago   Substance and Sexual Activity     Alcohol use: Yes     Comment: Alcoholic Drinks/day: occasional     Drug use: No     Sexual activity: Not on file   Other Topics Concern     Not on file   Social History Narrative     Not on file     Social Determinants of Health     Financial Resource Strain: Not on file   Food Insecurity: Not on file   Transportation Needs: Not on file   Physical Activity: Not on file   Stress: Not on file   Social Connections: Not on file   Intimate Partner Violence: Not on file   Housing Stability: Not on file       ALLERGIES  Codeine sulfate [codeine]    The following portions of the patient's history were reviewed and updated as appropriate: allergies, current medications, past family history, past medical history, past social history, past surgical history and problem list.    Review of Systems  A comprehensive review of systems was negative except for: What is noted above    Mental Status Examination  Alertness:  alert  and oriented  Appearance:  casually groomed  Behavior/Demeanor:   cooperative, pleasant and calm, with good  eye contact.  Speech:  normal  Psychomotor:  normal or unremarkable    Mood:  good  Affect:  appropriate and was congruent to speech content.  Thought Process/Associations: unremarkable   Thought Content: devoid of  suicidal and violent ideation and delusions.   Perception: devoid of  auditory hallucinations and visual hallucinations  Insight:  good.  Judgment: good.  Attention/Concentration:  Normal  Language:  Intact  Fund of Knowledge:  Average.    Memory:  Immediate recall intact, Short-term memory intact and Long-term memory intact.       Counseling:   Discussion was held with the patient today regarding concussion in general including types of injury, symptoms that are common, treatment and variability in time to recover  I have reassured the patient her symptoms are very common when a concussion is present and will improve with time. We discussed the risks and benefits of possible medication used to help concussive symptoms including risk of worsening depression with medication adjustments and even the possibility of emergence of suicidal ideations. We will assess for the appropriateness of possible psychotropic medication trials/changes. The patient will seek out appropriate emergency services should that become necessary. The patient agrees to call/message before her next visit with any questions, concerns or problems.    Visit Details:   Type of service: Telephone Visit    Visit Start Time: 1438    Visit End Time:  1448    Originating Location (pt. Location): Home    Distant Location:  Distant Location (provider location):  Off-site    Base Clinic:  [facility]  [place]    Mode of Communication: Telephone Conference via  [facility]     Diagnosis managed and treated at today's visit :  Post concussion syndrome  Post concussion headache  Nausea  Dizziness  Fatigue  Insomnia  Sensitivity to light  Sound sensitivity  Concentration and  Attention deficit  Memory difficulties  Anxiety d/t a medical condition  Irritability  Return to work  Encounter related to a worker's comp claim    Total time today (15 min) in this patient encounter was spent on pre-charting, chart review, review of outside records, review of test results, interpretation of tests, patient visit, documentation and return to work letter and counseling and/or coordination of care. The patient is in agreement with this plan and has no further questions.    General Information:   Today you had your appointment with Marly Verdugo CNP     If lab work was done today as part of your evaluation you will generally be contacted via My Chart, mail, or phone with the results within 1-5 days. If there is an alarming result we will contact you by phone. Lab results come back at varying times, I generally wait until all labs are resulted before making comments on results. Please note labs are automatically released to My Chart once available.     If you need refills please contact your pharmacist. They will send a refill request to me to review. If it is a controlled substance please message me through demandmart. Please allow 3 business days for us to process all refill requests.     Please call or send a medical message through My Chart, with any questions or concerns    If you need any paperwork completed please fax forms to [phone]. Please state if you would like a copy of the completed paperwork, mailed or faxed back to the patient and a fax number to fax the paperwork to. Please allow up to 10 business days for paperwork to be completed.    BABITA Lyman CNP      [facility]  [facility] [address]  [address] [place] [address]  Office: [phone]  Fax: [phone]          Again, thank you for allowing me to participate in the care of your patient.         Sincerely,        BABITA Lyman CNP

## 2023-03-17 NOTE — NURSING NOTE
Is the patient currently in the state of [place]? YES    Visit mode:VIDEO    If the visit is dropped, the patient can be reconnected by: VIDEO VISIT: Text to cell phone: [phone]    Will anyone else be joining the visit? NO      How would you like to obtain your AVS? MyChart    Are changes needed to the allergy or medication list? NO    Reason for visit: Follow up     CONCUSSION SYMPTOMS ASSESSMENT 1/6/2023 3/17/2023   Headache or Pressure In Head 0 - none 0 - none   Upset Stomach or Throwing Up 0 - none 0 - none   Problems with Balance 0 - none 1 - mild   Feeling Dizzy 0 - none 1 - mild   Sensitivity to Light 0 - none 2 - mild to moderate   Sensitivity to Noise 0 - none 2 - mild to moderate   Mood Changes 0 - none 0 - none   Feeling sluggish, hazy, or foggy 1 - mild 1 - mild   Trouble Concentrating, Lack of Focus 2 - mild to moderate 2 - mild to moderate   Motion Sickness 0 - none 1 - mild   Vision Changes 0 - none 0 - none   Memory Problems 1 - mild 1 - mild   Feeling Confused 0 - none 0 - none   Neck Pain 0 - none 0 - none   Trouble Sleeping 0 - none 1 - mild   Total Number of Symptoms 3 9   Symptom Severity Score 4 12

## 2023-03-20 ENCOUNTER — TELEPHONE (OUTPATIENT)
Dept: NEUROLOGY | Facility: CLINIC | Age: 58
End: 2023-03-20

## 2023-03-28 ENCOUNTER — TELEPHONE (OUTPATIENT)
Dept: NEUROLOGY | Facility: CLINIC | Age: 58
End: 2023-03-28

## 2023-03-29 ENCOUNTER — TELEPHONE (OUTPATIENT)
Dept: NEUROLOGY | Facility: CLINIC | Age: 58
End: 2023-03-29

## 2023-03-29 NOTE — TELEPHONE ENCOUNTER
Agapito Luke asked care team to fax the work letter to the pt's [employer].    I am not certain who is the [employer] worker for the pt. I've sent a [employer] message to the pt asking for the [employer] woker's info. Once info is received, will fax work letter.      RINA Franco on 3/29/2023 at 8:38 AM

## 2023-03-30 NOTE — TELEPHONE ENCOUNTER
Called the pt and was able to obtain her QR worker's information    [facility] worker:  Isadora   P: [phone]  F: [phone]    Work letter printed and faxed to [facility] worker per Marly.      RINA Franco on 3/30/2023 at 9:23 AM

## 2023-03-30 NOTE — TELEPHONE ENCOUNTER
Duplicate request from Marly.  Per Marly asked that we fax work note to [employer] worker. Letter was already faxed today.    RINA Franco on 3/30/2023 at 9:25 AM

## 2023-04-10 ENCOUNTER — HOSPITAL ENCOUNTER (OUTPATIENT)
Dept: OCCUPATIONAL THERAPY | Facility: CLINIC | Age: 58
Setting detail: THERAPIES SERIES
Discharge: HOME | End: 2023-04-10
Attending: NURSE PRACTITIONER
Payer: OTHER MISCELLANEOUS

## 2023-04-10 DIAGNOSIS — F07.81 POST CONCUSSION SYNDROME: ICD-10-CM

## 2023-04-10 PROCEDURE — 97165 OT EVAL LOW COMPLEX 30 MIN: CPT | Mod: GO

## 2023-04-10 PROCEDURE — 97535 SELF CARE MNGMENT TRAINING: CPT | Mod: GO

## 2023-04-25 ENCOUNTER — HOSPITAL ENCOUNTER (OUTPATIENT)
Dept: SPEECH THERAPY | Facility: CLINIC | Age: 58
Setting detail: THERAPIES SERIES
Discharge: HOME | End: 2023-04-25
Attending: NURSE PRACTITIONER
Payer: OTHER MISCELLANEOUS

## 2023-04-25 DIAGNOSIS — F07.81 POST CONCUSSION SYNDROME: ICD-10-CM

## 2023-04-25 PROCEDURE — 92523 SPEECH SOUND LANG COMPREHEN: CPT | Mod: GN

## 2023-04-25 NOTE — PROGRESS NOTES
04/25/23 1500       Present No   General Information   Type of Evaluation Speech and Language   Type Of Visit Initial   Start Of Care Date 04/25/23   Referring Physician Marly Verdugo APRN CNP   Orders Evaluate And Treat   Orders Comment complex problem solving, word finding   Medical Diagnosis Post concussion syndrome (F07.81)   Onset Of Illness/injury Or Date Of Surgery 11/23/22   Precautions/Limitations  no known precautions/limitations   Hearing judged to be adequate in a clinical setting   Surgical/Medical history reviewed Yes   Pertinent History Of Current Problem Keyana Dickerson is a pleasant 57 year old female who presents to OP SLP for evaluation following concussion. Per ED note on 11/23/22...Keyana Dickerson is a 57 year old female with a pertinent history of anxiety and MDD who presents to the ED due to mechanical fall. Patient states that at 6:15.  She was walking into a walk-in cooler at work in [employer] when she slid and fell backwards due to slippery floor. Patient states that she fell backwards and hit the back of her head. She denied LOC. She developed headache at the back of her head and the front. She denied visual changes, ringing in her ears. She felt pain in both elbows and her sacrum. Patient denied numbness, tingling, weakness, loss of bladder or bowel control. She had Excedrin for the headaches. She reported nausea when she was transferred on a cart, but denies emesis. She was able to ambulate from the car to the ED without assistance. In the ED patient reports that she still has an headache, bilateral elbow pain and tenderness on her sacrum. Pt endorses she was putting off scheduling rehab appts d/t concern for coverage with work comp and not wanting a bill. Pt reports what bothers her the most is the inability to come up with words at times. Pt denies difficulty with memory, feels it was not the best prior to concussion. Pt endorses too much background noise gets  "her worked up and sensitivity to light is ongoing.   Current Community Support  Family/friend caregiver   Patient Role/employment History Employed   Living environment [facility]   General Observations Pt reports she works in the [employer] dept at [employer]. Pt pleasant, alert, and cooperative.   Patient/family Goals To improve word finding   General Information Comments Pt completed the concussion symptom assessment last on 4/10 with OT with a score of 21. Today Pt reports symptoms resulting in reduced score of 12.   Language: Auditory Comprehension (understanding of spoken language)   Tests were administered at the following levels Complex (vocation/community/social activities)   Paragraph; Discourse Comprehension Test (out of 8 total; less than 7 is below mean) 8   Language: Verbal Expression (use of spoken language to express information)   Tests were administered at the following levels Moderate (routine daily activities);Complex (vocation/community/social activities)   Generate Sentences; [place] Test for Differential Diagnosis Of Aphasia (out of 6 total) 6   Picture Description; [place] Diagnostic Aphasia Exam rating/[place] Test for Differential Diagnosis Of Aphasia Picture (out of 5 total) 4   [place] Naming Test, short form (out of 15 total) 15   Define Words; [place] Test for Differential Diagnosis Of Aphasia (out of 10 total) 7   Generative Naming Score; Cognitive Linguistic Quick Test 5   Generative Naming; Cognitive Linguistic Quick Test Result Below mean   Comments (Verbal Expression) Pt demonstrates moderate deficits with generatvive naming stating \"I can't believe I can't think of more animals\".   Reading Comprehension (understanding of written language)   Tests were administered at the following levels Complex (vocation/community/social activities);Moderate (routine daily activities)   Sentences and Paragraphs; [place] Diagnostic Aphasia Exam (out of 10 total) 9   Practical Reading (out of 7 total) 7 "   Functional Assessment Scale (Reading Comprehension) No Impairment   Written Expression (use of writing to express information)   Comments (Written Expression) Pt does not report symptoms in this domain so this was not formally assessed.   Education Assessment   Barriers to Learning No barriers   Preferred Learning Style Listening;Reading;Demonstration;Pictures/video   General Therapy Interventions   Planned Therapy Interventions Language   Language Verbal expression   Clinical Impression, SLP Stephan   Criteria for Skilled Therapeutic Interventions Met (SLP Eval) Yes, treatment indicated   SLP Diagnosis mild aphasia   Influenced by the following factors/impairments concussion   Functional limitations due to impairments functional communication with peers and coworkers   Rehab potential affected by motivation   Therapy Frequency 1 time;per week   Predicted Duration of Therapy Intervention (days/wks) 4 weeks   Risks and Benefits of Treatment have been explained. Yes   Patient, Family & other staff in agreement with plan of care Yes   Clinical Impression Comments Pt presents with mild expressive aphasia in the area of verbal expression secondary to post concussion syndrome. Symptoms include word finding difficulty in conversation characterized by delayed processing, specifically when fatigued or stressed and word level paraphasias. Language symptoms are impairing Pt s ability to communicate easily and effectively with others. RECOMMEND: a short course of skilled intervention targeting verbal expression to improve language skills for daily functional communication.   Language/Cognition Goals   Language/Cognition Goals 1   Language/Cognition Goal 1   Goal Identifier [id number] Expression   Goal Description Patient will learn, implement, and demonstrate use of 3 word-finding strategies with 80% accuracy provided with min cues to communicate with friends easily and effectively without an increase in post concussion  symptoms.   Target Date 07/23/23   Total Session Time   Sound production with lang comprehension and expression minutes (92995) 35   Total Evaluation Time 35

## 2023-04-28 NOTE — PROGRESS NOTES
04/10/23 1300   Quick Adds   Quick Adds Concussion   Type of Visit Initial Outpatient Occupational Therapy Evaluation       Present No   General Information   Start Of Care Date 04/10/23   Referring Physician Marly eVrdugo APRN CNP   Orders Evaluate and treat as indicated   Other Orders SLP, neuropsych   Orders Date 03/17/23   Medical Diagnosis Post concussion syndrome (F07.81)  - Primary   Onset of Illness/Injury or Date of Surgery 11/23/22   Precautions/Limitations No known precautions/limitations   Special Instructions Sluggish/Hazy/Foggy, Memory/Confusion, Trouble Concentrating   Surgical/Medical History Reviewed Yes   Additional Occupational Profile Info/Pertinent History of Current Problem Keyana, age 57, loves to craft and sell items and lives with her significant other and dog, Erika. Client suffered concussion Nov 2022. She was walking into a walk-in cooler at work in [facility] when she slid and fell backwards due to slippery floor. Client is concerned with ongoing symptomology and related occupational dysfunction, particular concerns wtih memory and concentration, and was referred for further related evaluation and treatment. Pertinent history of anxiety and MDD.   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[place], [employer] [facility] [employer] and [employer]   Current Living Environment [facility]  (Lives with S/O and a renter, also has one dog, Erika)   Home/Community Accessibility Comments No equipment needs reported or identified   Prior Level - Transfers Independent   Prior Level - Ambulation Independent   Prior Level - ADLS Independent   Prior Responsibilities - IADL Meal Preparation;Housekeeping;Laundry;Shopping;Medication management;Finances;Driving;Work   Patient/family Goals Statement to get symptoms under  "control and get back to living my life   Vision Interview   Technology Used Work computer, phone   Technology Use Increases Symptoms Yes   Technology Use Increases Symptoms Comments visual abnormalities and fatigue   Do Glasses Help Comments Recently had cataracts surgery on both eyes, separate surgeries, really hard to see now and do everything online, phone or computer, as waiting for updated prescription after eyes heal either next Mon or in 4 week   Light Sensitivity/Glare  Indoor;Outdoor   Light Sensitivity/Glare Comments photophobia and phonophobia at home, community, work   Impaired Vision Blurred vision   Impaired Vision Comments Difficult to assess with recent cataracts surgery, continue to assess and address visual fatigue   Reported Reading Speed Comments Impaired while awaiting formal lenses, is not participating in near vision tasks as prior with irritability, fatigue, and blurred vision   Reading Endurance/Fatigue   Complaints of Visual Fatigue Comments yes, near vision and with variable lighting/screens   Additional Comments Formal assessment deferred today with recent cataracts surgery, continue to assess at appropriate time   Difficulties with IADL Performance: Increase in Symptoms with the Following   Difficulty Concentrating at School, Work or Home yes, unable to concentrate as prior with limited tolerance to and completion of tasks such as home management, cooking, work tasks   Difficulty Multi-Tasking/Planning Unable to tolerate or manage as prior with ongoing symptomology   Sensory Tolerance photophobia and phonophobia   Fall Risk Screen   Fall screen completed by OT   Have you fallen 2 or more times in the past year? No   Have you fallen and had an injury in the past year? Yes   Fall screen comments One fall resulting in head injury, does not utilize DME, states fell at the gym a week ago as she caught her foot and fell backwards landing on cushion on elbow. \"My balance is not good\", further " assessment indicated, deferred today d/t lack of time   Abuse Screen (yes response referral indicated)   Feels Unsafe at Home or Work/School no   Feels Threatened by Someone no   Does Anyone Try to Keep You From Having Contact with Others or Doing Things Outside Your Home? no   Physical Signs of Abuse Present no   Patient needs abuse support services and resources No   System Outcome Measures   Outcome Measures Concussion (see Concussion Symptom Assessment)  (Total score 21 across 10 symptoms)   Cognitive Status Examination   Orientation Orientation to person, place and time   Level of Consciousness Alert   Follows Commands and Answers Questions 100% of the time   Personal Safety and Judgment Intact   Cognitive Comment MoCA 8.1 cognitive screening WNL 28/30 (norm 26/30) with deficits noted in word generation -1 (8/11), delayed recall -1 (MIS 14/15). Notable fatigue and reported increase in symptomology with participation, very effortful and both cognitively and visually fatiguing, limited tolerance demonstrated today indicating further evaluation. Noted inefficient executive functioning, difficulty with task initiation and switching, difficulty with semantic memory requiring repeat instructions, task tracking, and organization.   Range of Motion (ROM)   ROM Comments No deficits noted BUE per client report and in the context of clinic tasks   Strength   Strength Comments No deficits noted BUE per client report and in the context of clinic tasks   Hand Strength   Hand Strength Comments No deficits noted BUE per client report and in the context of clinic tasks   Coordination   Coordination Comments No deficits noted BUE per client report and in the context of clinic tasks   Transfer Skill   Level of [place]: Transfers independent   Bathing   Level of [place] - Bathing independent   Upper Body Dressing   Level of [place]: Dress Upper Body independent   Lower Body Dressing   Level of [place]: Dress  Lower Body independent   Toileting   Level of [place]: Toilet independent   Grooming   Level of [place]: Grooming independent   Eating/Self-Feeding   Level of [place]: Eating independent   Instrumental Activities of Daily Living Assessment   IADL Assessment/Observations Difficulty managing tasks that were routine prior to concussion such as cooking, shopping, home management, work with ongoing photophobia, phonophobia, fatigue. Intervention indicated to improve sensory management and functional task tolerance to return to OF   Planned Therapy Interventions   Planned Therapy Interventions Self care/Home management;Therapeutic activities;Cognitive skills;IADL training   Adult OT Eval Goals   OT Eval Goals (Adult) 1;2;3    OT Goal 1   Goal Identifier Sensory management   Goal Description Client will verbalize 2-3 strategies for phonophobia and visual modifications (environmental or adaptive) to compensate for decreased vision, light sensitivity and eye strain for increased independence with reading tasks, completing ADL/IADLs and to increase participation in work and community tasks   Target Date 07/08/23    OT Goal 2   Goal Identifier Functional cognition   Goal Description Client will demonstrate the ability to complete 2 moderately complex tasks simultaneously with distractions, with 85% accuracy and little to no difficulty, for a 20 minute time frame, for increased concentration and attention skills to improve ability to participate in work and home tasks   Target Date 07/08/23    OT Goal 3   Goal Identifier Symptom management   Goal Description Client will successfully identify and utilize 3 strategies (adaptations, visual strategies, etc.) to decrease their post-concussion symptoms and report a score of 18 or less on 3 consecutive visits on the concussion symptom assessment score for increased independence and participation during ADL/IADLs (community and  home tasks).   Target Date 07/08/23    Clinical Impression   Criteria for Skilled Therapeutic Interventions Met Yes, treatment indicated   OT Diagnosis Impaired I/ADL participation, ease, and efficiency s/p concussion   Influenced by the following impairments concentration, memory, photophobia, phonophobia   Identified Performance Deficits cooking, shopping, work,   Clinical Decision Making (Complexity) Low complexity   Therapy Frequency up to 10 visits in 90 days   Predicted Duration of Therapy Intervention (days/wks) x1/week   Risks and Benefits of Treatment have been explained. Yes   Patient, Family & other staff in agreement with plan of care Yes   Clinical Impression Comments Client benefits from skilled OT services to improve IADL participation, ease, and efficiency s/p concussion. Client represents good rehab potential and in agreement with POC established today   Education Assessment   Barriers To Learning No Barriers   Preferred Learning Style Listening;Reading;Demonstration;Pictures/video   Total Evaluation Time   OT Stephan, Low Complexity Minutes ([id number]) 96

## 2023-05-02 ENCOUNTER — HOSPITAL ENCOUNTER (OUTPATIENT)
Dept: SPEECH THERAPY | Facility: CLINIC | Age: 58
Setting detail: THERAPIES SERIES
Discharge: HOME | End: 2023-05-02
Attending: NURSE PRACTITIONER
Payer: OTHER MISCELLANEOUS

## 2023-05-02 PROCEDURE — 92507 TX SP LANG VOICE COMM INDIV: CPT | Mod: GN,GT

## 2023-05-02 NOTE — PROGRESS NOTES
Keyana Dickerson is a 57 year old female who is being seen via a billable video visit.      Patient has given verbal consent for Video visit? Yes    Video Start Time: 12:28pm    Telehealth Visit Details    Type of Service:  Telehealth    Video End Time (time video stopped): 1:08pm    Originating Location (pt. location): Home    Additional Participants in Telehealth Visit: No    Distant Location (provider location):   [facility]    Mode of Communication (Audio Visual or Audio Only):  Audio Visual    Yancy Banuelos, SLP  May 2, 2023

## 2023-05-09 ENCOUNTER — HOSPITAL ENCOUNTER (OUTPATIENT)
Dept: SPEECH THERAPY | Facility: CLINIC | Age: 58
Setting detail: THERAPIES SERIES
Discharge: HOME | End: 2023-05-09
Attending: NURSE PRACTITIONER
Payer: OTHER MISCELLANEOUS

## 2023-05-09 PROCEDURE — 92507 TX SP LANG VOICE COMM INDIV: CPT | Mod: GN,GT

## 2023-05-09 NOTE — PROGRESS NOTES
Keyana Dickerson is a 57 year old female who is being seen via a billable video visit.      Patient has given verbal consent for Video visit? Yes    Video Start Time: 12:33pm    Telehealth Visit Details    Type of Service:  Telehealth    Video End Time (time video stopped): 1:08pm    Originating Location (pt. location): Home    Additional Participants in Telehealth Visit: No    Distant Location (provider location):   [facility]    Mode of Communication (Audio Visual or Audio Only):  Audio visual    Yancy Banuelos, SLP  May 9, 2023

## 2023-05-09 NOTE — PROGRESS NOTES
[facility]    Outpatient Occupational Therapy Discharge Note  Patient: Keyana Dickerson  : 1965    Beginning/End Dates of Reporting Period:  04/10/23 to 04/10/23    Referring Provider: Marly Verdugo APRN CNP    Therapy Diagnosis: Impaired I/ADL participation, ease, and efficiency s/p concussion    Client Self Report: See eval report    Objective Measurements: See eval report    Goals:     Goal Identifier Sensory management   Goal Description Client will verbalize 2-3 strategies for phonophobia and visual modifications (environmental or adaptive) to compensate for decreased vision, light sensitivity and eye strain for increased independence with reading tasks, completing ADL/IADLs and to increase participation in work and community tasks   Target Date 23   Date Met      Progress (detail required for progress note):  Client not seen since eval, chooses to discontinue therapies     Goal Identifier Functional cognition   Goal Description Client will demonstrate the ability to complete 2 moderately complex tasks simultaneously with distractions, with 85% accuracy and little to no difficulty, for a 20 minute time frame, for increased concentration and attention skills to improve ability to participate in work and home tasks   Target Date 23   Date Met      Progress (detail required for progress note):  Client not seen since eval, chooses to discontinue therapies     Goal Identifier Symptom management   Goal Description Client will successfully identify and utilize 3 strategies (adaptations, visual strategies, etc.) to decrease their post-concussion symptoms and report a score of 18 or less on 3 consecutive visits on the concussion symptom assessment score for increased independence and participation during ADL/IADLs (community and  home tasks).   Target Date 23   Date Met      Progress  (detail required for progress note):  Client not seen since eval, chooses to discontinue therapies     Plan:  Discharge from therapy.    Client has not been seen since evaluation and cancels remaining appointments stating improved symptomology and return to activity citing no further needs at this time per SLP who facilitated communication with this OT to complete discharge per client request.     Discharge:    Reason for Discharge: Patient chooses to discontinue therapy.    Equipment Issued: N/A    Discharge Plan: Return to therapies PRN should experience change in functional status.

## 2023-05-26 ENCOUNTER — TELEPHONE (OUTPATIENT)
Dept: NEUROLOGY | Facility: CLINIC | Age: 58
End: 2023-05-26

## 2023-05-26 NOTE — TELEPHONE ENCOUNTER
[facility] Call Center    Phone Message    May a detailed message be left on voicemail: yes     Reason for Call: Other: Bandar from [employer] is calling to verify if patient has an upcoming appt for her concussion. Advised patient would need to establish care with new Neurologist. Looks like from cancelled appt with Marly Verdugo, [facility] patient declined to schedule and Bandar would like a copy of that cancelled appt note for their records. Please fax to [phone]. If any questions, please reach out to Bandar at [phone]     Action Taken: Message routed to:  Other: [facility]    Travel Screening: Not Applicable

## 2023-05-26 NOTE — TELEPHONE ENCOUNTER
Notes indicating of cancelled appt has been faxed to [employer] at given fax # below.    RINA Franco on 5/26/2023 at 8:56 AM

## 2024-03-09 ENCOUNTER — HEALTH MAINTENANCE LETTER (OUTPATIENT)
Age: 59
End: 2024-03-09

## 2024-09-12 ENCOUNTER — APPOINTMENT (OUTPATIENT)
Dept: CT IMAGING | Facility: CLINIC | Age: 59
End: 2024-09-12
Payer: COMMERCIAL

## 2024-09-12 ENCOUNTER — HOSPITAL ENCOUNTER (EMERGENCY)
Facility: CLINIC | Age: 59
Discharge: HOME | End: 2024-09-12
Attending: STUDENT IN AN ORGANIZED HEALTH CARE EDUCATION/TRAINING PROGRAM | Admitting: STUDENT IN AN ORGANIZED HEALTH CARE EDUCATION/TRAINING PROGRAM
Payer: COMMERCIAL

## 2024-09-12 VITALS
HEART RATE: 64 BPM | SYSTOLIC BLOOD PRESSURE: 127 MMHG | WEIGHT: 180 LBS | TEMPERATURE: 98 F | RESPIRATION RATE: 20 BRPM | HEIGHT: 64 IN | OXYGEN SATURATION: 95 % | DIASTOLIC BLOOD PRESSURE: 76 MMHG | BODY MASS INDEX: 30.73 KG/M2

## 2024-09-12 DIAGNOSIS — R42 VERTIGO: ICD-10-CM

## 2024-09-12 DIAGNOSIS — R51.9 NONINTRACTABLE HEADACHE, UNSPECIFIED CHRONICITY PATTERN, UNSPECIFIED HEADACHE TYPE: ICD-10-CM

## 2024-09-12 LAB
ANION GAP SERPL CALCULATED.3IONS-SCNC: 11 MMOL/L (ref 7–15)
BASOPHILS # BLD AUTO: 0 10E3/UL (ref 0–0.2)
BASOPHILS NFR BLD AUTO: 1 %
BUN SERPL-MCNC: 14.2 MG/DL (ref 6–20)
CALCIUM SERPL-MCNC: 9.7 MG/DL (ref 8.8–10.4)
CHLORIDE SERPL-SCNC: 103 MMOL/L (ref 98–107)
CREAT SERPL-MCNC: 0.76 MG/DL (ref 0.51–0.95)
EGFRCR SERPLBLD CKD-EPI 2021: >90 ML/MIN/1.73M2
EOSINOPHIL # BLD AUTO: 0.1 10E3/UL (ref 0–0.7)
EOSINOPHIL NFR BLD AUTO: 2 %
ERYTHROCYTE [DISTWIDTH] IN BLOOD BY AUTOMATED COUNT: 12.4 % (ref 10–15)
GLUCOSE SERPL-MCNC: 83 MG/DL (ref 70–99)
HCO3 SERPL-SCNC: 24 MMOL/L (ref 22–29)
HCT VFR BLD AUTO: 35.1 % (ref 35–47)
HGB BLD-MCNC: 11.9 G/DL (ref 11.7–15.7)
IMM GRANULOCYTES # BLD: 0 10E3/UL
IMM GRANULOCYTES NFR BLD: 1 %
LYMPHOCYTES # BLD AUTO: 1.5 10E3/UL (ref 0.8–5.3)
LYMPHOCYTES NFR BLD AUTO: 28 %
MAGNESIUM SERPL-MCNC: 2.3 MG/DL (ref 1.7–2.3)
MCH RBC QN AUTO: 32 PG (ref 26.5–33)
MCHC RBC AUTO-ENTMCNC: 33.9 G/DL (ref 31.5–36.5)
MCV RBC AUTO: 94 FL (ref 78–100)
MONOCYTES # BLD AUTO: 0.3 10E3/UL (ref 0–1.3)
MONOCYTES NFR BLD AUTO: 6 %
NEUTROPHILS # BLD AUTO: 3.4 10E3/UL (ref 1.6–8.3)
NEUTROPHILS NFR BLD AUTO: 63 %
NRBC # BLD AUTO: 0 10E3/UL
NRBC BLD AUTO-RTO: 0 /100
PLATELET # BLD AUTO: 252 10E3/UL (ref 150–450)
POTASSIUM SERPL-SCNC: 4.4 MMOL/L (ref 3.4–5.3)
RBC # BLD AUTO: 3.72 10E6/UL (ref 3.8–5.2)
SODIUM SERPL-SCNC: 138 MMOL/L (ref 135–145)
WBC # BLD AUTO: 5.4 10E3/UL (ref 4–11)

## 2024-09-12 PROCEDURE — 250N000011 HC RX IP 250 OP 636

## 2024-09-12 PROCEDURE — 80048 BASIC METABOLIC PNL TOTAL CA: CPT

## 2024-09-12 PROCEDURE — 83735 ASSAY OF MAGNESIUM: CPT

## 2024-09-12 PROCEDURE — 96375 TX/PRO/DX INJ NEW DRUG ADDON: CPT

## 2024-09-12 PROCEDURE — 258N000003 HC RX IP 258 OP 636

## 2024-09-12 PROCEDURE — 70496 CT ANGIOGRAPHY HEAD: CPT

## 2024-09-12 PROCEDURE — 85025 COMPLETE CBC W/AUTO DIFF WBC: CPT

## 2024-09-12 PROCEDURE — 250N000013 HC RX MED GY IP 250 OP 250 PS 637

## 2024-09-12 PROCEDURE — 99285 EMERGENCY DEPT VISIT HI MDM: CPT | Mod: 25

## 2024-09-12 PROCEDURE — 96374 THER/PROPH/DIAG INJ IV PUSH: CPT | Mod: 59

## 2024-09-12 PROCEDURE — 96361 HYDRATE IV INFUSION ADD-ON: CPT

## 2024-09-12 PROCEDURE — 36415 COLL VENOUS BLD VENIPUNCTURE: CPT

## 2024-09-12 RX ORDER — MECLIZINE HYDROCHLORIDE 25 MG/1
25 TABLET ORAL 4 TIMES DAILY PRN
Qty: 30 TABLET | Refills: 0 | Status: SHIPPED | OUTPATIENT
Start: 2024-09-12

## 2024-09-12 RX ORDER — IOPAMIDOL 755 MG/ML
75 INJECTION, SOLUTION INTRAVASCULAR ONCE
Status: COMPLETED | OUTPATIENT
Start: 2024-09-12 | End: 2024-09-12

## 2024-09-12 RX ORDER — MECLIZINE HYDROCHLORIDE 25 MG/1
25 TABLET ORAL 3 TIMES DAILY PRN
Qty: 30 TABLET | Refills: 0 | Status: SHIPPED | OUTPATIENT
Start: 2024-09-12 | End: 2024-09-12

## 2024-09-12 RX ORDER — MECLIZINE HYDROCHLORIDE 25 MG/1
25 TABLET ORAL ONCE
Status: COMPLETED | OUTPATIENT
Start: 2024-09-12 | End: 2024-09-12

## 2024-09-12 RX ORDER — ACETAMINOPHEN 325 MG/1
650 TABLET ORAL ONCE
Status: COMPLETED | OUTPATIENT
Start: 2024-09-12 | End: 2024-09-12

## 2024-09-12 RX ORDER — DIPHENHYDRAMINE HYDROCHLORIDE 50 MG/ML
25 INJECTION INTRAMUSCULAR; INTRAVENOUS ONCE
Status: COMPLETED | OUTPATIENT
Start: 2024-09-12 | End: 2024-09-12

## 2024-09-12 RX ADMIN — MECLIZINE HYDROCHLORIDE 25 MG: 25 TABLET ORAL at 14:13

## 2024-09-12 RX ADMIN — DIPHENHYDRAMINE HYDROCHLORIDE 25 MG: 50 INJECTION INTRAMUSCULAR; INTRAVENOUS at 14:16

## 2024-09-12 RX ADMIN — ACETAMINOPHEN 650 MG: 325 TABLET ORAL at 14:13

## 2024-09-12 RX ADMIN — IOPAMIDOL 75 ML: 755 INJECTION, SOLUTION INTRAVENOUS at 14:58

## 2024-09-12 RX ADMIN — SODIUM CHLORIDE 1000 ML: 9 INJECTION, SOLUTION INTRAVENOUS at 14:15

## 2024-09-12 RX ADMIN — PROCHLORPERAZINE EDISYLATE 10 MG: 5 INJECTION INTRAMUSCULAR; INTRAVENOUS at 14:16

## 2024-09-12 ASSESSMENT — ACTIVITIES OF DAILY LIVING (ADL)
ADLS_ACUITY_SCORE: 35
ADLS_ACUITY_SCORE: 35

## 2024-09-12 NOTE — ED PROVIDER NOTES
EMERGENCY DEPARTMENT ENCOUNTER      NAME: Keyana Dickerson  AGE: 58 year old female  YOB: 1965  MRN: [id number]  EVALUATION DATE & TIME: No admission date for patient encounter.    PCP: Mariaa Alfaro    ED PROVIDER: Sade Kelly PA-C    FINAL IMPRESSION:   Headache     CHIEF COMPLAINT:  Dizziness   Headache     MEDICAL DECISION MAKING:  Keyana Dickerson is a 58 year old female with a pertinent history of vitamin D deficiency, depression, HLD who presents to this ED by private care with friend for evaluation of dizziness and headache since this AM. Vitals reviewed and unremarkable. On exam, well appearing in no acute distress.  Neuro examination completely unremarkable.  EOMs intact without nystagmus.  PERRLA.  Strength 5 of 5 in the upper and lower extremities.  Sensation intact throughout without paresthesias.  Cranial nerves tested and intact.  Finger-nose testing intact.  Gait steady without ataxia.    Because the headache is worse when she is laying flat and she does not normally get headaches this severe as well as the vertigo, will obtain CTA to rule out mass, intracranial hemorrhage, vertebral dissection, etc.  Will also give her a migraine cocktail including meclizine and reassess.    CTA head and neck without any findings.  Patient reports ports complete resolution of symptoms after medications here.  Her laboratory workup is reassuring.  At this point, I do think this is just benign vertigo.  Recommended she take Tylenol and ibuprofen for headaches at home and will prescribe meclizine if she has vertigo symptoms at home.  Patient was agreeable to this plan and she was discharged in stable condition.      ED COURSE  Pertinent Labs & Imaging studies reviewed. (See chart for details)  1254 I met with the patient and obtained a history and performed a physical exam  1425 staffed with Dr. Frank  7689 I discussed plan for discharge. Return precautions were discussed. Discharged by ED RN.           Medical Decision Making  Obtained supplemental history:Supplemental history obtained?: No  Reviewed external records: External records reviewed?: No  Care impacted by chronic illness:N/A  Care significantly affected by social determinants of health:N/A  Did you consider but not order tests?: Work up considered but not performed and documented in chart, if applicable  Did you interpret images independently?: Independent interpretation of ECG and images noted in documentation, when applicable.  Consultation discussion with other provider:Did you involve another provider (consultant, , pharmacy, etc.)?: I discussed the care with another health care provider, see documentation for details.  Discharge. No recommendations on prescription strength medication(s). See documentation for any additional details.        0 minutes of critical care time     MEDICATIONS GIVEN IN THE EMERGENCY:  Medications   sodium chloride 0.9% BOLUS 1,000 mL (0 mLs Intravenous Stopped 9/12/24 1603)   acetaminophen (TYLENOL) tablet 650 mg (650 mg Oral $Given 9/12/24 1413)   prochlorperazine (COMPAZINE) injection 10 mg (10 mg Intravenous $Given 9/12/24 1416)   diphenhydrAMINE (BENADRYL) injection 25 mg (25 mg Intravenous $Given 9/12/24 1416)   meclizine (ANTIVERT) tablet 25 mg (25 mg Oral $Given 9/12/24 1413)   iopamidol (ISOVUE-370) solution 75 mL (75 mLs Intravenous $Given 9/12/24 1458)       NEW PRESCRIPTIONS STARTED AT TODAY'S ER VISIT  Discharge Medication List as of 9/12/2024  5:38 PM        START taking these medications    Details   !! meclizine (ANTIVERT) 25 MG tablet Take 1 tablet (25 mg) by mouth 4 times daily as needed for dizziness., Disp-30 tablet, R-0, E-Prescribe      !! meclizine (ANTIVERT) 25 MG tablet Take 1 tablet (25 mg) by mouth 3 times daily as needed for dizziness., Disp-30 tablet, R-0, E-Prescribe       !! - Potential duplicate medications found. Please discuss with provider.        Discharge Medication List as of  "9/12/2024  5:38 PM          =================================================================    HPI    Patient information was obtained from: patient  Use of : N/A     Keyana Dickerson is a 58 year old female with a pertinent history of vitamin D deficiency, depression, HLD who presents to this ED by private care with friend for evaluation of dizziness and headache since this AM.    Denies hx of headaches but got one Tuesday evening and was severe. Took APAP and it helped but was still there. Wednesday woke up dizz/lightheaded in the AM. Lasted for 3-4 hours and went away. [place] fine last night, went to dinner with friends. Today woke up with both a headache, nausea, and dizziness. Initially she couldn't even sit up in bed or walk due to the dizziness being so severe and worse with head movements, now dizziness improving (still not fully improved) but headache there and worsening. Describes it as feeling lightheaded, heavy head, and head \"swimming\" but denies room spinning dizziness. And having gait disturbances. Reports she's been having headaches intermittently over the past few months which is new for her. No hx of headaches.    Hysterectomy in June. No recent travel. Not on any estrogen/HRT. Chronic lower abdominal pain since June. Rates it 1-2 out of 10. Started taking gas x and that's not helping. CT abd with contrast ordered by OBGYN 1 week ago and nothing of note. No new medications. No blood thinners or head trauma. Hasn't taken anything for the headache today. Called and spoke to clinic triage who referred her here.     No fevers, chills, vomiting, diarrhea, urinary symptoms, back pain, abdominal pain, leg swelling, leg pain, chest pain, SOB, URI sx, visual changes, numbness, tingling, ear pain, tinnitus, ear drainage.    PHYSICAL EXAM    /76   Pulse 64   Temp 98  F (36.7  C) (Oral)   Resp 20   Ht 1.626 m (5' 4\")   Wt 81.6 kg (180 lb)   SpO2 95%   BMI 30.90 kg/m    Constitutional: " Well developed, Well nourished, NAD, GCS 15  HENT: Normocephalic, Atraumatic, Bilateral external ears normal, Oropharynx normal, mucous membranes moist, Nose normal. Neck- Normal range of motion  Eyes: EOMs intact without nystagmus.  PERRLA.  Conjunctiva normal, No discharge.   Respiratory: Normal breath sounds, No respiratory distress, No wheezing, Speaks full sentences easily. No cough.    Cardiovascular: Normal heart rate, Regular rhythm, No murmurs, No rubs, No gallops.   Musculoskeletal: 2+ DP pulses. No edema. No cyanosis, No clubbing. Good range of motion in all major joints. No tenderness to palpation or major deformities noted. No tenderness of the CTLS spine.    Integument: Warm, Dry, No erythema, No rash.    Neurologic: Alert & oriented x 3, Neuro examination completely unremarkable. Strength 5 of 5 in the upper and lower extremities.  Sensation intact throughout without paresthesias.  Cranial nerves tested and intact.  Finger-nose testing intact.  Gait steady without ataxia  Psychiatric: Affect normal, Judgment normal, Mood normal. Cooperative.      LAB:  All pertinent labs reviewed and interpreted.  Results for orders placed or performed during the hospital encounter of 09/12/24   CTA Head Neck with Contrast    Impression    IMPRESSION:  1. Normal head CT.  2. Normal head CTA.  3. Normal neck CTA.    Radiation dose for this scan was reduced using automated exposure  control, adjustment of the mA and/or kV according to patient size, or  iterative reconstruction technique       Radiation dose for this scan was reduced using automated exposure  control, adjustment of the mA and/or kV according to patient size, or  iterative reconstruction technique.     ADRIEN BEEBE MD         SYSTEM ID:  [id number]   Basic metabolic panel   Result Value Ref Range    Sodium 138 135 - 145 mmol/L    Potassium 4.4 3.4 - 5.3 mmol/L    Chloride 103 98 - 107 mmol/L    Carbon Dioxide (CO2) 24 22 - 29 mmol/L    Anion Gap 11 7 -  15 mmol/L    Urea Nitrogen 14.2 6.0 - 20.0 mg/dL    Creatinine 0.76 0.51 - 0.95 mg/dL    GFR Estimate >90 >60 mL/min/1.73m2    Calcium 9.7 8.8 - 10.4 mg/dL    Glucose 83 70 - 99 mg/dL   Result Value Ref Range    Magnesium 2.3 1.7 - 2.3 mg/dL   CBC with platelets and differential   Result Value Ref Range    WBC Count 5.4 4.0 - 11.0 10e3/uL    RBC Count 3.72 (L) 3.80 - 5.20 10e6/uL    Hemoglobin 11.9 11.7 - 15.7 g/dL    Hematocrit 35.1 35.0 - 47.0 %    MCV 94 78 - 100 fL    MCH 32.0 26.5 - 33.0 pg    MCHC 33.9 31.5 - 36.5 g/dL    RDW 12.4 10.0 - 15.0 %    Platelet Count 252 150 - 450 10e3/uL    % Neutrophils 63 %    % Lymphocytes 28 %    % Monocytes 6 %    % Eosinophils 2 %    % Basophils 1 %    % Immature Granulocytes 1 %    NRBCs per 100 WBC 0 <1 /100    Absolute Neutrophils 3.4 1.6 - 8.3 10e3/uL    Absolute Lymphocytes 1.5 0.8 - 5.3 10e3/uL    Absolute Monocytes 0.3 0.0 - 1.3 10e3/uL    Absolute Eosinophils 0.1 0.0 - 0.7 10e3/uL    Absolute Basophils 0.0 0.0 - 0.2 10e3/uL    Absolute Immature Granulocytes 0.0 <=0.4 10e3/uL    Absolute NRBCs 0.0 10e3/uL       RADIOLOGY:  Reviewed all pertinent imaging. Please see official radiology report.  CTA Head Neck with Contrast   Final Result   IMPRESSION:   1. Normal head CT.   2. Normal head CTA.   3. Normal neck CTA.      Radiation dose for this scan was reduced using automated exposure   control, adjustment of the mA and/or kV according to patient size, or   iterative reconstruction technique          Radiation dose for this scan was reduced using automated exposure   control, adjustment of the mA and/or kV according to patient size, or   iterative reconstruction technique.       ADRIEN BEEBE MD            SYSTEM ID:  [id number]            Sade Kelly PA-C  [facility] EMERGENCY ROOM  [address] [place] [id number] [phone]        Sade Kelly PA-C  09/17/24 1615

## 2024-09-12 NOTE — ED PROVIDER NOTES
"Emergency Department Midlevel Supervisory Note     I had a face to face encounter with this patient seen by the Advanced Practice Provider ([facility]). I personally made/approved the management plan and take responsibility for the patient management. I personally saw patient and performed a substantive portion of the visit including all aspects of the medical decision making.     ED Course:  2:25 PM Sade Kelly PA-C staffed patient with me. I agree with their assessment and plan of management, and I will see the patient.  2:28 PM I met with the patient to introduce myself, gather additional history, perform my initial exam, and discuss the plan.     Brief HPI:     Keyana Dickerson is a 58 year old female who presents for evaluation of headaches and dizziness.     Patient denies a history of headaches but got one the evening of 09/10/24 and was severe. Patient states they took APAP for the headache and it provided relief, but it was still there. She notes then on 09/11/24 she woke up dizzy/lightheaded in the AM. She states this lasted for 3-4 hours before it then resolved. She endorses feeling fine during the night of 09/11/24 and going to dinner with friends. She reports that then on 09/12/24 (today) she woke up with a headache, nausea, and dizziness. Initially she couldn't even sit up in bed or walk due to the dizziness being so severe and worse with head movements, but she notes that now the dizziness is improving (but not fully resolved), however, the headache is constant and progressively worsening. She describes her symptoms as feeling lightheaded, heavy headed, and having head \"swimming,\" but she denies any room spinning dizziness. She mentions she's been having headaches intermittently over the past few months which is new for her. She denies any history of headaches.    Patient denies starting any new medications recently. She denies taking any blood thinners or any recent head trauma. She denies taking " "anything for the headache today. She states she called and spoke to a clinic triage today regarding her symptoms who prompted her to visit an ED for evaluation. She denies any recent fevers, chills, vomiting, diarrhea, urinary symptoms, back pain, abdominal pain, leg swelling, leg pain, chest pain, SOB, urinary issues, visual changes, numbness, tingling, ear pain, tinnitus, ear drainage, or any other complications at this time.     I, Sergio Lal, am serving as a scribe to document services personally performed by Juvenal Frank MD, based on my observations and the provider's statements to me.   I, Juvenal Frank MD attest that Sergio Lal was acting in a scribe capacity, has observed my performance of the services and has documented them in accordance with my direction.    Brief Physical Exam:   /74   Pulse 57   Temp 98  F (36.7  C) (Oral)   Resp 20   Ht 1.626 m (5' 4\")   Wt 81.6 kg (180 lb)   SpO2 96%   BMI 30.90 kg/m    Constitutional:  Alert, in no acute distress  EYES: Conjunctivae clear  HENT:  Atraumatic  Respiratory:  Respirations even, unlabored, in no acute respiratory distress  Cardiovascular:  Regular rate and rhythm, good peripheral perfusion  GI: Soft, non-distended, non-tender  Musculoskeletal:  Moves all 4 extremities equally, grossly symmetrical strength  Integument: Warm & dry.   Neurologic:  Alert & oriented, speech clear and fluent, no focal deficits noted  Psych: Normal mood and affect    MDM:  58-year-old female presents for evaluation of headache and dizziness.  Headaches seem to be happening with increasing frequency over the past few months or so, at least once per week, though over the last few days headaches have been more persistent and is also noticed vertigo upon waking the last 2 days.  Vertigo was present when she wakes up, describes it as feeling like her head is underwater, but denies room spinning dizziness, states that it slowly improves with time, " though is made worse by position changes and head movement.  No neurologic signs such as numbness or tingling.  Normal neurologic exam, alert, oriented, able to carry on a conversation.  Headache did not improve with Tylenol hence presentation here to the ED for further evaluation.  Patient treated with migraine cocktail and some meclizine and had significant improvement in symptoms.  Discussed short prescription for meclizine at home with patient at her  at bedside.  Will discharge home after fairly reassuring workup and improvement in symptoms    1. Nonintractable headache, unspecified chronicity pattern, unspecified headache type          Labs and Imaging:  Results for orders placed or performed during the hospital encounter of 09/12/24   CTA Head Neck with Contrast    Impression    IMPRESSION:  1. Normal head CT.  2. Normal head CTA.  3. Normal neck CTA.    Radiation dose for this scan was reduced using automated exposure  control, adjustment of the mA and/or kV according to patient size, or  iterative reconstruction technique       Radiation dose for this scan was reduced using automated exposure  control, adjustment of the mA and/or kV according to patient size, or  iterative reconstruction technique.     ADRIEN BEEBE MD         SYSTEM ID:  [id number]   Basic metabolic panel   Result Value Ref Range    Sodium 138 135 - 145 mmol/L    Potassium 4.4 3.4 - 5.3 mmol/L    Chloride 103 98 - 107 mmol/L    Carbon Dioxide (CO2) 24 22 - 29 mmol/L    Anion Gap 11 7 - 15 mmol/L    Urea Nitrogen 14.2 6.0 - 20.0 mg/dL    Creatinine 0.76 0.51 - 0.95 mg/dL    GFR Estimate >90 >60 mL/min/1.73m2    Calcium 9.7 8.8 - 10.4 mg/dL    Glucose 83 70 - 99 mg/dL   Result Value Ref Range    Magnesium 2.3 1.7 - 2.3 mg/dL   CBC with platelets and differential   Result Value Ref Range    WBC Count 5.4 4.0 - 11.0 10e3/uL    RBC Count 3.72 (L) 3.80 - 5.20 10e6/uL    Hemoglobin 11.9 11.7 - 15.7 g/dL    Hematocrit 35.1 35.0 - 47.0 %     MCV 94 78 - 100 fL    MCH 32.0 26.5 - 33.0 pg    MCHC 33.9 31.5 - 36.5 g/dL    RDW 12.4 10.0 - 15.0 %    Platelet Count 252 150 - 450 10e3/uL    % Neutrophils 63 %    % Lymphocytes 28 %    % Monocytes 6 %    % Eosinophils 2 %    % Basophils 1 %    % Immature Granulocytes 1 %    NRBCs per 100 WBC 0 <1 /100    Absolute Neutrophils 3.4 1.6 - 8.3 10e3/uL    Absolute Lymphocytes 1.5 0.8 - 5.3 10e3/uL    Absolute Monocytes 0.3 0.0 - 1.3 10e3/uL    Absolute Eosinophils 0.1 0.0 - 0.7 10e3/uL    Absolute Basophils 0.0 0.0 - 0.2 10e3/uL    Absolute Immature Granulocytes 0.0 <=0.4 10e3/uL    Absolute NRBCs 0.0 10e3/uL       I have reviewed the relevant laboratory studies above.    I independently interpreted the following imaging study(s):   CTA head images reviewed, do not appreciate any intracranial masses or bleeding.    Procedures:  I was present for the key portions of procedures documented in [facility]/midlevel note, see midlevel note for further details.    Juvenal Frank MD  [facility] EMERGENCY ROOM  [address] [address] [phone]       Juvenal Frank MD  09/12/24 [id number]

## 2024-09-12 NOTE — DISCHARGE INSTRUCTIONS
You are seen in the emergency department for dizziness and headache.  We treated you here and your symptoms resolved.  Your imaging was unremarkable without any acute findings.  Labs are reassuring.  If you get headaches at home, you can take Tylenol 1000 mg 4 times a day plus ibuprofen 600 mg 4 times a day.  As for the vertigo/dizziness, you can take meclizine 25 mg.  Follow-up with your primary doctor after this visit.  Please return to the emergency department if you develop any new or worsening symptoms.

## 2024-09-12 NOTE — ED NOTES
Department is full.  Did inform patient that providers do occasionally will see patient's in a semi private area to do exams, but that their cares may start while in the ED lobby.  Patient seemed to understand. Lizzy Hay RN  1:02 PM 9/12/2024     
Solitario, friend drove her in  
Breathing spontaneous and unlabored. Breath sounds clear and equal bilaterally with regular rhythm.

## 2024-09-12 NOTE — Clinical Note
Keyana Dickerson was seen and treated in our emergency department on 9/12/2024.  She may return to work on 09/13/2024.  Seen in ER. If feeling improved, can return tomorrow. But if still having symptoms, can have Friday off of work too.      If you have any questions or concerns, please don't hesitate to call.      Sade Kelly PA-C

## 2024-09-12 NOTE — ED TRIAGE NOTES
"Woke up yesterday with dizziness and headache, had a day leading up to yesterday with brief dizziness when she woke up but would go away.  Unsteady on feet and dizzy, first thing in the morning.  Nausea, no vomiting.  Headache starts on the forehead and goes over top of head, down neck and into her upper back.  Room/self is NOT spinning, she says it feelings more of a \"swimming sensation in her head\".  Blood pressure taken at home has been 120's/80's, which is high for her.    Life is more stressful for them currently.   Triage Assessment (Adult)       Row Name 09/12/24 4219          Triage Assessment    Airway WDL WDL        Respiratory WDL    Respiratory WDL WDL        Skin Circulation/Temperature WDL    Skin Circulation/Temperature WDL WDL        Cardiac WDL    Cardiac WDL WDL        Peripheral/Neurovascular WDL    Peripheral Neurovascular WDL WDL        Cognitive/Neuro/Behavioral WDL    Cognitive/Neuro/Behavioral WDL WDL                     "

## 2025-03-16 ENCOUNTER — HEALTH MAINTENANCE LETTER (OUTPATIENT)
Age: 60
End: 2025-03-16